# Patient Record
Sex: FEMALE | Race: WHITE | NOT HISPANIC OR LATINO
[De-identification: names, ages, dates, MRNs, and addresses within clinical notes are randomized per-mention and may not be internally consistent; named-entity substitution may affect disease eponyms.]

---

## 2017-07-06 ENCOUNTER — APPOINTMENT (OUTPATIENT)
Dept: ULTRASOUND IMAGING | Facility: CLINIC | Age: 62
End: 2017-07-06

## 2017-07-06 ENCOUNTER — OUTPATIENT (OUTPATIENT)
Dept: OUTPATIENT SERVICES | Facility: HOSPITAL | Age: 62
LOS: 1 days | End: 2017-07-06
Payer: COMMERCIAL

## 2017-07-06 ENCOUNTER — APPOINTMENT (OUTPATIENT)
Dept: MAMMOGRAPHY | Facility: CLINIC | Age: 62
End: 2017-07-06

## 2017-07-06 DIAGNOSIS — Z00.00 ENCOUNTER FOR GENERAL ADULT MEDICAL EXAMINATION WITHOUT ABNORMAL FINDINGS: ICD-10-CM

## 2017-07-06 PROCEDURE — 77067 SCR MAMMO BI INCL CAD: CPT

## 2017-07-06 PROCEDURE — 76642 ULTRASOUND BREAST LIMITED: CPT

## 2017-07-06 PROCEDURE — 77063 BREAST TOMOSYNTHESIS BI: CPT

## 2018-07-09 ENCOUNTER — APPOINTMENT (OUTPATIENT)
Dept: MAMMOGRAPHY | Facility: CLINIC | Age: 63
End: 2018-07-09
Payer: COMMERCIAL

## 2018-07-09 ENCOUNTER — OUTPATIENT (OUTPATIENT)
Dept: OUTPATIENT SERVICES | Facility: HOSPITAL | Age: 63
LOS: 1 days | End: 2018-07-09
Payer: COMMERCIAL

## 2018-07-09 ENCOUNTER — APPOINTMENT (OUTPATIENT)
Dept: ULTRASOUND IMAGING | Facility: CLINIC | Age: 63
End: 2018-07-09
Payer: COMMERCIAL

## 2018-07-09 DIAGNOSIS — R92.8 OTHER ABNORMAL AND INCONCLUSIVE FINDINGS ON DIAGNOSTIC IMAGING OF BREAST: ICD-10-CM

## 2018-07-09 PROCEDURE — 77066 DX MAMMO INCL CAD BI: CPT

## 2018-07-09 PROCEDURE — 71046 X-RAY EXAM CHEST 2 VIEWS: CPT

## 2018-07-09 PROCEDURE — G0279: CPT | Mod: 26

## 2018-07-09 PROCEDURE — 71046 X-RAY EXAM CHEST 2 VIEWS: CPT | Mod: 26

## 2018-07-09 PROCEDURE — 77080 DXA BONE DENSITY AXIAL: CPT | Mod: 26

## 2018-07-09 PROCEDURE — G0279: CPT

## 2018-07-09 PROCEDURE — 76641 ULTRASOUND BREAST COMPLETE: CPT | Mod: 26,50

## 2018-07-09 PROCEDURE — 77080 DXA BONE DENSITY AXIAL: CPT

## 2018-07-09 PROCEDURE — 77066 DX MAMMO INCL CAD BI: CPT | Mod: 26

## 2018-07-09 PROCEDURE — 76641 ULTRASOUND BREAST COMPLETE: CPT

## 2018-07-18 ENCOUNTER — RESULT REVIEW (OUTPATIENT)
Age: 63
End: 2018-07-18

## 2018-07-18 ENCOUNTER — OUTPATIENT (OUTPATIENT)
Dept: OUTPATIENT SERVICES | Facility: HOSPITAL | Age: 63
LOS: 1 days | End: 2018-07-18
Payer: COMMERCIAL

## 2018-07-18 ENCOUNTER — APPOINTMENT (OUTPATIENT)
Dept: ULTRASOUND IMAGING | Facility: CLINIC | Age: 63
End: 2018-07-18
Payer: COMMERCIAL

## 2018-07-18 DIAGNOSIS — R92.8 OTHER ABNORMAL AND INCONCLUSIVE FINDINGS ON DIAGNOSTIC IMAGING OF BREAST: ICD-10-CM

## 2018-07-18 PROCEDURE — 19083 BX BREAST 1ST LESION US IMAG: CPT

## 2018-07-18 PROCEDURE — 77065 DX MAMMO INCL CAD UNI: CPT

## 2018-07-18 PROCEDURE — 19083 BX BREAST 1ST LESION US IMAG: CPT | Mod: LT

## 2018-07-18 PROCEDURE — 77065 DX MAMMO INCL CAD UNI: CPT | Mod: 26,LT

## 2018-07-18 PROCEDURE — A4648: CPT

## 2018-07-18 PROCEDURE — 88305 TISSUE EXAM BY PATHOLOGIST: CPT | Mod: 26

## 2018-07-18 PROCEDURE — 88305 TISSUE EXAM BY PATHOLOGIST: CPT

## 2019-07-22 ENCOUNTER — OUTPATIENT (OUTPATIENT)
Dept: OUTPATIENT SERVICES | Facility: HOSPITAL | Age: 64
LOS: 1 days | End: 2019-07-22
Payer: COMMERCIAL

## 2019-07-22 ENCOUNTER — APPOINTMENT (OUTPATIENT)
Dept: MAMMOGRAPHY | Facility: CLINIC | Age: 64
End: 2019-07-22
Payer: COMMERCIAL

## 2019-07-22 ENCOUNTER — APPOINTMENT (OUTPATIENT)
Dept: ULTRASOUND IMAGING | Facility: CLINIC | Age: 64
End: 2019-07-22
Payer: COMMERCIAL

## 2019-07-22 DIAGNOSIS — R92.8 OTHER ABNORMAL AND INCONCLUSIVE FINDINGS ON DIAGNOSTIC IMAGING OF BREAST: ICD-10-CM

## 2019-07-22 PROCEDURE — 77063 BREAST TOMOSYNTHESIS BI: CPT

## 2019-07-22 PROCEDURE — 76641 ULTRASOUND BREAST COMPLETE: CPT | Mod: 26,50

## 2019-07-22 PROCEDURE — 77067 SCR MAMMO BI INCL CAD: CPT | Mod: 26

## 2019-07-22 PROCEDURE — 77063 BREAST TOMOSYNTHESIS BI: CPT | Mod: 26

## 2019-07-22 PROCEDURE — 77067 SCR MAMMO BI INCL CAD: CPT

## 2019-07-22 PROCEDURE — 76641 ULTRASOUND BREAST COMPLETE: CPT

## 2020-07-27 ENCOUNTER — APPOINTMENT (OUTPATIENT)
Dept: MAMMOGRAPHY | Facility: CLINIC | Age: 65
End: 2020-07-27
Payer: COMMERCIAL

## 2020-07-27 ENCOUNTER — APPOINTMENT (OUTPATIENT)
Dept: ULTRASOUND IMAGING | Facility: CLINIC | Age: 65
End: 2020-07-27
Payer: COMMERCIAL

## 2020-07-27 ENCOUNTER — OUTPATIENT (OUTPATIENT)
Dept: OUTPATIENT SERVICES | Facility: HOSPITAL | Age: 65
LOS: 1 days | End: 2020-07-27
Payer: COMMERCIAL

## 2020-07-27 DIAGNOSIS — R92.8 OTHER ABNORMAL AND INCONCLUSIVE FINDINGS ON DIAGNOSTIC IMAGING OF BREAST: ICD-10-CM

## 2020-07-27 DIAGNOSIS — Z00.00 ENCOUNTER FOR GENERAL ADULT MEDICAL EXAMINATION WITHOUT ABNORMAL FINDINGS: ICD-10-CM

## 2020-07-27 PROCEDURE — 77063 BREAST TOMOSYNTHESIS BI: CPT | Mod: 26

## 2020-07-27 PROCEDURE — 76641 ULTRASOUND BREAST COMPLETE: CPT

## 2020-07-27 PROCEDURE — 77067 SCR MAMMO BI INCL CAD: CPT | Mod: 26

## 2020-07-27 PROCEDURE — 77067 SCR MAMMO BI INCL CAD: CPT

## 2020-07-27 PROCEDURE — 76641 ULTRASOUND BREAST COMPLETE: CPT | Mod: 26,50

## 2020-07-27 PROCEDURE — 77063 BREAST TOMOSYNTHESIS BI: CPT

## 2021-07-29 ENCOUNTER — OUTPATIENT (OUTPATIENT)
Dept: OUTPATIENT SERVICES | Facility: HOSPITAL | Age: 66
LOS: 1 days | End: 2021-07-29
Payer: COMMERCIAL

## 2021-07-29 ENCOUNTER — APPOINTMENT (OUTPATIENT)
Dept: ULTRASOUND IMAGING | Facility: CLINIC | Age: 66
End: 2021-07-29
Payer: COMMERCIAL

## 2021-07-29 ENCOUNTER — APPOINTMENT (OUTPATIENT)
Dept: MAMMOGRAPHY | Facility: CLINIC | Age: 66
End: 2021-07-29
Payer: COMMERCIAL

## 2021-07-29 DIAGNOSIS — Z00.8 ENCOUNTER FOR OTHER GENERAL EXAMINATION: ICD-10-CM

## 2021-07-29 PROCEDURE — 76641 ULTRASOUND BREAST COMPLETE: CPT | Mod: 26,50

## 2021-07-29 PROCEDURE — 77063 BREAST TOMOSYNTHESIS BI: CPT | Mod: 26

## 2021-07-29 PROCEDURE — 77067 SCR MAMMO BI INCL CAD: CPT

## 2021-07-29 PROCEDURE — 77063 BREAST TOMOSYNTHESIS BI: CPT

## 2021-07-29 PROCEDURE — 77067 SCR MAMMO BI INCL CAD: CPT | Mod: 26

## 2021-07-29 PROCEDURE — 76641 ULTRASOUND BREAST COMPLETE: CPT

## 2022-08-02 ENCOUNTER — APPOINTMENT (OUTPATIENT)
Dept: MAMMOGRAPHY | Facility: CLINIC | Age: 67
End: 2022-08-02

## 2022-08-02 ENCOUNTER — APPOINTMENT (OUTPATIENT)
Dept: ULTRASOUND IMAGING | Facility: CLINIC | Age: 67
End: 2022-08-02

## 2022-08-02 PROCEDURE — 77063 BREAST TOMOSYNTHESIS BI: CPT

## 2022-08-02 PROCEDURE — 77067 SCR MAMMO BI INCL CAD: CPT

## 2022-08-02 PROCEDURE — 76641 ULTRASOUND BREAST COMPLETE: CPT | Mod: 50

## 2022-10-06 ENCOUNTER — NON-APPOINTMENT (OUTPATIENT)
Age: 67
End: 2022-10-06

## 2022-11-13 ENCOUNTER — NON-APPOINTMENT (OUTPATIENT)
Age: 67
End: 2022-11-13

## 2023-01-27 ENCOUNTER — APPOINTMENT (OUTPATIENT)
Dept: ORTHOPEDIC SURGERY | Facility: CLINIC | Age: 68
End: 2023-01-27
Payer: COMMERCIAL

## 2023-01-27 VITALS — HEIGHT: 61.5 IN | WEIGHT: 120 LBS | BODY MASS INDEX: 22.37 KG/M2

## 2023-01-27 DIAGNOSIS — E78.00 PURE HYPERCHOLESTEROLEMIA, UNSPECIFIED: ICD-10-CM

## 2023-01-27 DIAGNOSIS — S86.912A STRAIN OF UNSPECIFIED MUSCLE(S) AND TENDON(S) AT LOWER LEG LEVEL, LEFT LEG, INITIAL ENCOUNTER: ICD-10-CM

## 2023-01-27 DIAGNOSIS — Z78.9 OTHER SPECIFIED HEALTH STATUS: ICD-10-CM

## 2023-01-27 PROCEDURE — 99203 OFFICE O/P NEW LOW 30 MIN: CPT | Mod: 25

## 2023-01-27 PROCEDURE — J3490M: CUSTOM

## 2023-01-27 PROCEDURE — 73564 X-RAY EXAM KNEE 4 OR MORE: CPT | Mod: LT

## 2023-01-27 PROCEDURE — 20611 DRAIN/INJ JOINT/BURSA W/US: CPT | Mod: LT

## 2023-01-27 RX ORDER — EZETIMIBE AND SIMVASTATIN 10; 20 MG/1; MG/1
10-20 TABLET ORAL
Refills: 0 | Status: ACTIVE | COMMUNITY

## 2023-01-27 NOTE — ASSESSMENT
[FreeTextEntry1] : TRAUMATIC LEFT KNEE PAIN AFTER FALLING DOWN STAIRS 4 MONTHS AGO, WORSENED AFTER PROLONGED WALKING 4 DAYS AGO\par XRAYS NEGATIVE\par CSI DISCUSSED AND DONE TODAY, TOLERATED WELL\par LEAVING FOR [place] NEXT WEEK FOR 1-2 MONTHS\par DISCUSSED MRI IF SYMPTOMS PERSIST WHEN SHE RETURNS\par ICE, NSAIDS, SLEEVE PRN

## 2023-01-27 NOTE — PROCEDURE
[FreeTextEntry3] : - Large joint injection was performed of the LEFT knee. \par - The indication for this procedure was pain and inflammation. Patient has tried OTC's including aspirin, Ibuprofen, Aleve, etc or prescription NSAIDS, and/or exercises at home and/or physical therapy without satisfactory response, patient had decreased mobility in the joint and the risks benefits, and alternatives have been discussed, and verbal consent was obtained. The site was prepped with alcohol, betadine, ethyl chloride sprayed topically and sterile technique used. \par - An injection of Betamethasone 2cc; Marcaine 5cc injected.\par - Patient was advised to call if redness, pain or fever occur, apply ice for 15 minutes out of every hour for the next 12-24 hours as tolerated and patient was advised to rest the joint(s) for 2 days. \par - Ultrasound guidance was indicated for this patient due to prior failure or difficult injection. All ultrasound images have been permanently captured and stored accordingly in our picture archiving and communication system. Visualization of the needle and placement of injection was performed without complication.\par

## 2023-01-27 NOTE — HISTORY OF PRESENT ILLNESS
[7] : 7 [Dull/Aching] : dull/aching [Localized] : localized [Part time] : Work status: part time [] : Post Surgical Visit: no [FreeTextEntry1] : L Knee [FreeTextEntry3] : 9/2022 [FreeTextEntry5] : 66 Y/O RHD F eval L Knee. pain since september due to HX of fall in her house down the stairs. No prior TX. pain worsened after prolonged walking this past monday, pain is lateral. [de-identified] : none [de-identified] :

## 2023-01-27 NOTE — IMAGING
[de-identified] : LEFT KNEE\par No effusion, no warmth, no ecchymosis, no erythema.\par LATERAL JOINT TTP\par Range of motion 0-140 without pain\par 5/5 quadriceps and hamstring strength\par Negative Lachman, negative Mario Alberto, negative anterior drawer, negative posterior drawer, negative patella apprehension; no extensor lag: no varus or valgus instability.\par Motor and sensory intact distally\par Negative Clarita\par Non-antalgic gait\par  [Left] : left knee [All Views] : anteroposterior, lateral, skyline, and anteroposterior standing [There are no fractures, subluxations or dislocations. No significant abnormalities are seen] : There are no fractures, subluxations or dislocations. No significant abnormalities are seen

## 2023-03-21 ENCOUNTER — FORM ENCOUNTER (OUTPATIENT)
Age: 68
End: 2023-03-21

## 2023-03-22 ENCOUNTER — APPOINTMENT (OUTPATIENT)
Dept: MRI IMAGING | Facility: CLINIC | Age: 68
End: 2023-03-22
Payer: COMMERCIAL

## 2023-03-22 ENCOUNTER — APPOINTMENT (OUTPATIENT)
Dept: ORTHOPEDIC SURGERY | Facility: CLINIC | Age: 68
End: 2023-03-22
Payer: COMMERCIAL

## 2023-03-22 VITALS — HEIGHT: 61.5 IN | BODY MASS INDEX: 22.37 KG/M2 | WEIGHT: 120 LBS

## 2023-03-22 DIAGNOSIS — M93.272 OSTEOCHONDRITIS DISSECANS, LEFT ANKLE AND JOINTS OF LEFT FOOT: ICD-10-CM

## 2023-03-22 PROCEDURE — 73721 MRI JNT OF LWR EXTRE W/O DYE: CPT | Mod: LT

## 2023-03-22 PROCEDURE — 99214 OFFICE O/P EST MOD 30 MIN: CPT

## 2023-03-22 PROCEDURE — 73610 X-RAY EXAM OF ANKLE: CPT | Mod: LT

## 2023-03-22 NOTE — PHYSICAL EXAM
[Left] : left foot and ankle [] : no achilles tendon tenderness [NL (40)] : plantar flexion 40 degrees [NL 30)] : inversion 30 degrees [NL (20)] : eversion 20 degrees [TWNoteComboBox7] : dorsiflexion 15 degrees

## 2023-03-22 NOTE — ASSESSMENT
[FreeTextEntry1] : wbat\par concern for lateral talar ocd\par mri to eval cartilage injury\par further plan pending MRI

## 2023-03-22 NOTE — HISTORY OF PRESENT ILLNESS
[2] : 2 [0] : 0 [de-identified] : 03/22/2023:  fall on stairs about 5 months ago injuring L ankle.  having continued pain since then. denies swelling. no tx to date. denies prior ankle probs. denies dm/tob.  [FreeTextEntry1] : left ankle

## 2023-03-29 ENCOUNTER — APPOINTMENT (OUTPATIENT)
Dept: ORTHOPEDIC SURGERY | Facility: CLINIC | Age: 68
End: 2023-03-29
Payer: COMMERCIAL

## 2023-03-29 VITALS — BODY MASS INDEX: 22.37 KG/M2 | HEIGHT: 61.5 IN | WEIGHT: 120 LBS

## 2023-03-29 PROCEDURE — 99214 OFFICE O/P EST MOD 30 MIN: CPT

## 2023-03-29 NOTE — DATA REVIEWED
[MRI] : MRI [Left] : left [Ankle] : ankle [I reviewed the films/CD and additionally noted] : I reviewed the films/CD and additionally noted [FreeTextEntry1] : low grade sprain, no ocd

## 2023-03-29 NOTE — HISTORY OF PRESENT ILLNESS
[2] : 2 [0] : 0 [de-identified] : 03/22/2023:  fall on stairs about 5 months ago injuring L ankle.  having continued pain since then. denies swelling. no tx to date. denies prior ankle probs. denies dm/tob. \par \par 03/29/2023: MRI results. no sig change. c/o tingling in leg [] : no [FreeTextEntry1] : left ankle

## 2023-03-29 NOTE — PHYSICAL EXAM
[Left] : left foot and ankle [NL (40)] : plantar flexion 40 degrees [NL 30)] : inversion 30 degrees [NL (20)] : dorsiflexion 20 degrees [5___] : eversion 5[unfilled]/5 [2+] : dorsalis pedis pulse: 2+ [] : patient ambulates without assistive device [TWNoteComboBox7] : dorsiflexion 15 degrees

## 2023-04-11 ENCOUNTER — APPOINTMENT (OUTPATIENT)
Dept: NEUROLOGY | Facility: CLINIC | Age: 68
End: 2023-04-11
Payer: COMMERCIAL

## 2023-04-11 DIAGNOSIS — M79.662 PAIN IN LEFT LOWER LEG: ICD-10-CM

## 2023-04-11 PROCEDURE — 95886 MUSC TEST DONE W/N TEST COMP: CPT

## 2023-04-11 PROCEDURE — 95911 NRV CNDJ TEST 9-10 STUDIES: CPT

## 2023-04-12 ENCOUNTER — RESULT CHARGE (OUTPATIENT)
Age: 68
End: 2023-04-12

## 2023-04-12 ENCOUNTER — APPOINTMENT (OUTPATIENT)
Dept: ORTHOPEDIC SURGERY | Facility: CLINIC | Age: 68
End: 2023-04-12
Payer: COMMERCIAL

## 2023-04-12 VITALS — HEIGHT: 61.5 IN | BODY MASS INDEX: 22.37 KG/M2 | WEIGHT: 120 LBS

## 2023-04-12 DIAGNOSIS — G57.92 UNSPECIFIED MONONEUROPATHY OF LEFT LOWER LIMB: ICD-10-CM

## 2023-04-12 PROCEDURE — 72100 X-RAY EXAM L-S SPINE 2/3 VWS: CPT

## 2023-04-12 RX ORDER — MELOXICAM 15 MG/1
15 TABLET ORAL DAILY
Qty: 30 | Refills: 2 | Status: ACTIVE | COMMUNITY
Start: 2023-04-12 | End: 1900-01-01

## 2023-04-12 NOTE — ASSESSMENT
[FreeTextEntry1] : 67 F with 51/2 months of left leg pain.  Has tried CSI in knee and EMG which showed no definitive findings.,\par Pain clinicaly sound liked L4 radiculopathy.  X_rays show degen changes that could be correlated with this.,\par MRI l spine to rule out HNP\par  We will also provide a prescription for anti-inflammatories.  Discussed major side effects of medication including but not limited to gastritis and acute kidney injury.  She was instructed to take with food and to discontinue use if stomach or esophageal pain developed.\par FU after MRI

## 2023-04-12 NOTE — HISTORY OF PRESENT ILLNESS
[7] : 7 [Tingling] : tingling [Frequent] : frequent [Full time] : Work status: full time [de-identified] : [birth year] 68yo female presenting for EMG results of the lower extremities. Referred by Dr. Mullen to discuss possible radic/neurologic pain. Patient has previous left ankle injury about 5 1/2 months ago. Reports N/T occasional from left knee to left ankle. No lower back pain.   HAs been treated with CSI in left knee.  Saw Se[facility] who had negative MRI and ordered EMG because he felt it was likely more radicular.\par \par Pain is worst in left ankle.  Radiates left anterior knee to shin.  No weakness., No back pain.  Pain wakes her from sleep regularly.  SEvere in left ankle feels like it is being strangled. \par \par Works as an . \par  [] : no [FreeTextEntry1] : left ankle [FreeTextEntry6] : numb [de-identified] : x-ray [de-identified] : [employer] /

## 2023-04-12 NOTE — IMAGING
[de-identified] : Spine:\par Inspection/Palpation:\par No tenderness to palpation throughout Cervical/thoracic/lumbar spine.\par No bony stepoffs, No lesions.\par \par Gait:\par Non-antalgic, able to perform bilateral toe and heel rise.  Able to perform tandem gait.  \par \par Range of Motion:\par Lumbar Spine: Flexion to 90 degrees, Extension to 30 degrees, rotation 30 degrees bilaterally, lateral flexion to 30 degrees bilaterally.\par \par Neurologic:\par \par Bilateral Lower Extremities 5/5 Iliopsoas/Quadriceps/Hamstrings/ Tibialis Anterior/ Gastrocnemius. Extensor Hallucis Longus/ Flexor Hallucis Longus except \par \par \par Sensation intact to light touch L2-S1\par \par Patellar/ Achilles reflex within normal limits.\par \par \par Negative straight leg raise\par \par No pain with IR/ER/Flexion of HIps bilaterally \par \par X-ray Ap/Lateral/Flexion/Extension of lumbar spine were viewed and interpreted.  Normal alignment is maintained without any spondylolisthesis.\par L4-5 and L5-s1 disc height loss

## 2023-04-19 ENCOUNTER — APPOINTMENT (OUTPATIENT)
Dept: MRI IMAGING | Facility: CLINIC | Age: 68
End: 2023-04-19

## 2023-05-03 ENCOUNTER — APPOINTMENT (OUTPATIENT)
Dept: ORTHOPEDIC SURGERY | Facility: CLINIC | Age: 68
End: 2023-05-03

## 2023-10-24 ENCOUNTER — APPOINTMENT (OUTPATIENT)
Dept: RADIOLOGY | Facility: CLINIC | Age: 68
End: 2023-10-24
Payer: COMMERCIAL

## 2023-10-24 PROCEDURE — 77080 DXA BONE DENSITY AXIAL: CPT

## 2023-11-01 ENCOUNTER — APPOINTMENT (OUTPATIENT)
Dept: ULTRASOUND IMAGING | Facility: CLINIC | Age: 68
End: 2023-11-01
Payer: COMMERCIAL

## 2023-11-01 PROCEDURE — 76536 US EXAM OF HEAD AND NECK: CPT

## 2024-01-22 ENCOUNTER — APPOINTMENT (OUTPATIENT)
Dept: ULTRASOUND IMAGING | Facility: CLINIC | Age: 69
End: 2024-01-22
Payer: COMMERCIAL

## 2024-01-22 ENCOUNTER — APPOINTMENT (OUTPATIENT)
Dept: MAMMOGRAPHY | Facility: CLINIC | Age: 69
End: 2024-01-22
Payer: COMMERCIAL

## 2024-01-22 PROCEDURE — 77067 SCR MAMMO BI INCL CAD: CPT

## 2024-01-22 PROCEDURE — 76641 ULTRASOUND BREAST COMPLETE: CPT | Mod: 50

## 2024-01-22 PROCEDURE — 77063 BREAST TOMOSYNTHESIS BI: CPT

## 2024-07-26 ENCOUNTER — APPOINTMENT (OUTPATIENT)
Dept: ULTRASOUND IMAGING | Facility: CLINIC | Age: 69
End: 2024-07-26
Payer: COMMERCIAL

## 2024-07-26 PROCEDURE — 76642 ULTRASOUND BREAST LIMITED: CPT | Mod: RT

## 2025-06-09 ENCOUNTER — APPOINTMENT (OUTPATIENT)
Dept: ULTRASOUND IMAGING | Facility: CLINIC | Age: 70
End: 2025-06-09
Payer: COMMERCIAL

## 2025-06-09 ENCOUNTER — APPOINTMENT (OUTPATIENT)
Dept: MAMMOGRAPHY | Facility: CLINIC | Age: 70
End: 2025-06-09
Payer: COMMERCIAL

## 2025-06-09 PROCEDURE — 77067 SCR MAMMO BI INCL CAD: CPT

## 2025-06-09 PROCEDURE — 77063 BREAST TOMOSYNTHESIS BI: CPT

## 2025-06-09 PROCEDURE — 76536 US EXAM OF HEAD AND NECK: CPT

## 2025-06-09 PROCEDURE — 76641 ULTRASOUND BREAST COMPLETE: CPT | Mod: 50
